# Patient Record
Sex: MALE | Race: BLACK OR AFRICAN AMERICAN | NOT HISPANIC OR LATINO | Employment: STUDENT | URBAN - METROPOLITAN AREA
[De-identification: names, ages, dates, MRNs, and addresses within clinical notes are randomized per-mention and may not be internally consistent; named-entity substitution may affect disease eponyms.]

---

## 2019-09-30 ENCOUNTER — TRANSFERRED RECORDS (OUTPATIENT)
Dept: HEALTH INFORMATION MANAGEMENT | Facility: CLINIC | Age: 24
End: 2019-09-30

## 2019-10-04 ENCOUNTER — MEDICAL CORRESPONDENCE (OUTPATIENT)
Dept: HEALTH INFORMATION MANAGEMENT | Facility: CLINIC | Age: 24
End: 2019-10-04

## 2019-12-06 ENCOUNTER — TELEPHONE (OUTPATIENT)
Dept: OPHTHALMOLOGY | Facility: CLINIC | Age: 24
End: 2019-12-06

## 2019-12-06 NOTE — TELEPHONE ENCOUNTER
Note to /facilitator to assist in review of records once recieved/scheduling with Dr. Racheal Jha, RN 11:47 AM 12/06/19        [facility]    Phone Message    May a detailed message be left on voicemail: yes    Reason for Call: Dr. Omar Betancourt at [facility] is referring patient for severe open angle glaucoma. Patient is scheduled with Dr. Springer for first available in Feb, 2020. Please review records that have been faxed to clinic for a sooner appointment and contact patient with appointment options    Action Taken: Message routed to:  [facility] Eye

## 2020-02-14 ENCOUNTER — TELEPHONE (OUTPATIENT)
Dept: OPHTHALMOLOGY | Facility: CLINIC | Age: 25
End: 2020-02-14

## 2020-02-14 NOTE — TELEPHONE ENCOUNTER
Benitez Peters [phone]   Called patient twice this AM and left message at [phone]    tentatively rescheduled with Dr. Springer to next Thursday 2- at 10 AM     Provided direct triage for any rescheduling needed    Nba Jha RN 8:46 AM 02/14/20          [facility]    Phone Message    May a detailed message be left on voicemail: yes     Reason for Call: Other: Pt waited a couple months for this appt and then it was too cold to leave his home so he had to cancel.  Now he wants to reschedule but Dr Springer is full out until mid-August.  Pt wants the back line to see if they can assist him in finding and appt for his referral.  Please call    Action Taken: Message routed to:  [facility] eye clinic    Travel Screening: Not Applicable

## 2020-03-23 ENCOUNTER — TELEPHONE (OUTPATIENT)
Dept: OPHTHALMOLOGY | Facility: CLINIC | Age: 25
End: 2020-03-23

## 2020-03-23 NOTE — TELEPHONE ENCOUNTER
COVID-19 RESCHEDULES    Date contacted: March 23, 2020 1:01 PM    Type of contact: left message    Current appointment date: 03/31/2020    Attending provider: Racheal    Current Eye Symptoms: na    Refills needed: na    Best contact for rescheduling: na    Best date/time for rescheduling: FRANCOIS Fonseca 1:01 PM March 23, 2020

## 2020-04-30 ENCOUNTER — TRANSCRIBE ORDERS (OUTPATIENT)
Dept: OTHER | Age: 25
End: 2020-04-30

## 2020-04-30 DIAGNOSIS — H40.10X3 OPEN-ANGLE GLAUCOMA, SEVERE STAGE, UNSPECIFIED LATERALITY, UNSPECIFIED OPEN-ANGLE GLAUCOMA TYPE: Primary | ICD-10-CM

## 2020-05-04 ENCOUNTER — TELEPHONE (OUTPATIENT)
Dept: OPHTHALMOLOGY | Facility: CLINIC | Age: 25
End: 2020-05-04

## 2020-05-04 NOTE — TELEPHONE ENCOUNTER
Left message at 0940  Tentatively scheduled may 19th at 0830 with Dr. Springer    Left detailed message with date/time/location at [facility]/main clinic number and direct number to confirm appt  Reviewed visitor restrictions-- if visually impaired may have one visitor to accompany    Note to facilitator  Nba Jha RN 9:42 AM 05/06/20        Benitez Peters [phone]    Left message with direct number at [phone]  Nba Jha RN 1:36 PM 05/05/20    --    Reviewed by glaucoma team  Able to schedule with Dr. Springer may 19th    Called at 1035 to review/schedule     Nba Jha RN 10:38 AM 05/05/20         [facility]    Phone Message    May a detailed message be left on voicemail: yes     Reason for Call: Appointment Intake    Referring Provider Name: Dr. Omar Betancourt and Dr. Lord are referring this patient back for glaucoma. Patient has been scheduled with cancellations and no shows.  [place] at Dr. Betancourt's eye clinic is requesting one more chance at working patient in Dr. Springer's schedule.  Diagnosis and/or Symptoms: Glaucoma, vision loss CF in left eye and vision loss right eye.    Action Taken: Message routed to:  [facility]    Travel Screening: Not Applicable